# Patient Record
Sex: MALE | URBAN - METROPOLITAN AREA
[De-identification: names, ages, dates, MRNs, and addresses within clinical notes are randomized per-mention and may not be internally consistent; named-entity substitution may affect disease eponyms.]

---

## 2017-05-22 ENCOUNTER — HOSPITAL ENCOUNTER (EMERGENCY)
Facility: CLINIC | Age: 27
Discharge: HOME | End: 2017-05-22
Attending: EMERGENCY MEDICINE

## 2017-05-22 VITALS
DIASTOLIC BLOOD PRESSURE: 87 MMHG | SYSTOLIC BLOOD PRESSURE: 148 MMHG | OXYGEN SATURATION: 99 % | TEMPERATURE: 97.7 F | HEART RATE: 82 BPM | RESPIRATION RATE: 18 BRPM

## 2017-05-23 NOTE — ED NOTES
MD in to see patient, patient not in room. Both bathrooms checked as well as the ED lobby and parking lot with no sign of patient. Patient did not have PIV in place. Patient did not sign any paperwork or have vital signs done prior to leaving.

## 2017-05-23 NOTE — ED NOTES
About an hour ago, patient was on his skateboard going down a big hill and fell at the bottom. Patient hit his left ribcage and has some abrasions on his nose and face. Patient was not wearing a helmet.